# Patient Record
(demographics unavailable — no encounter records)

---

## 2024-12-04 NOTE — HISTORY OF PRESENT ILLNESS
[Dull/Aching] : dull/aching [Sharp] : sharp [Shooting] : shooting [de-identified] : 12/4/2024: PT is here for back pain. She states that she's had back pain for a while. Was seeing pain management for chronic pain in the lower back. Sent her for lumbar MRI from [facility] in October and is still being treated for lower back. Herniated disc, multiple margy discs in the lower back. Pain radiates down her leg, and wraps around her hips. Was diagnosed recently with Hypermobile EDS. Pain worse on right than left.   right buttocks to posterior thigh. Imrpoved with gabapentin.   Tied PT for 6-7 weeks   Seeing pain management.  Has not tried RUBEN  [] : no [FreeTextEntry1] : lower back

## 2024-12-04 NOTE — IMAGING
[de-identified] : Spine: Inspection/Palpation: No tenderness to palpation throughout Cervical/thoracic/lumbar spine. No bony stepoffs, No lesions.  Gait: Non-antalgic, able to perform bilateral toe and heel rise.  Able to perform tandem gait.    Range of Motion: Lumbar Spine: Flexion to 90 degrees, Extension to 30 degrees, rotation 30 degrees bilaterally, lateral flexion to 30 degrees bilaterally.  Neurologic:  Bilateral Lower Extremities 5/5 Iliopsoas/Quadriceps/Hamstrings/ Tibialis Anterior/ Gastrocnemius. Extensor Hallucis Longus/ Flexor Hallucis Longus except    Sensation intact to light touch L2-S1  Patellar/ Achilles reflex within normal limits.

## 2024-12-04 NOTE — ASSESSMENT
[FreeTextEntry1] : 42F with low back pain and radicular pain. Diagnosed with EDS MRI with [facility] with mild stenosis Pain management referral for RUBEN c/w PT  c/w gabapentin

## 2024-12-04 NOTE — DATA REVIEWED
[MRI] : MRI [Lumbar Spine] : lumbar spine [I independently reviewed and interpreted images and report] : I independently reviewed and interpreted images and report [FreeTextEntry1] : From [facility]  There is degenerative loss of disc signal with mild disc space narrowing at L2-S1. There is mild osteophyte formation. No significant focal bone marrow edema identified. No compression fracture. No significant spinal listhesis. No significant spondylolisthesis.  The conus medullaris appears normal in size and is located at the L1-L2 level. The paraspinal soft tissues are unremarkable.  Evaluation of individual lumbar levels demonstrates: T12-L1: No significant spinal canal or neuroforaminal stenosis. L1-2: No significant spinal canal or neuroforaminal stenosis. L2-3: Mild disc bulge. No significant spinal canal or neuroforaminal stenosis. L3-4: Disc bulge. Superimposed central disc protrusion which mildly narrows the spinal canal. Mild neuroforaminal narrowing. L4-5: Mild right facet arthropathy. Disc bulge. No significant spinal canal stenosis. Mild neuroforaminal narrowing. L5-S1:Mild facet arthropathy. Disc bulge. Superimposed small central disc protrusion which indents the thecal sac. No significant spinal canal stenosis. Mild neuroforaminal narrowing.   IMPRESSION:   Multilevel degenerative changes including central disc herniation at L3-L4 which mildly narrows the spinal canal. Additional small central disc herniation at L5-S1. Multilevel mild neuroforaminal narrowing.  Thank you for the opportunity to participate in the care of this patient.